# Patient Record
Sex: FEMALE | Race: WHITE | NOT HISPANIC OR LATINO | URBAN - METROPOLITAN AREA
[De-identification: names, ages, dates, MRNs, and addresses within clinical notes are randomized per-mention and may not be internally consistent; named-entity substitution may affect disease eponyms.]

---

## 2020-01-17 ENCOUNTER — APPOINTMENT (RX ONLY)
Dept: URBAN - METROPOLITAN AREA CLINIC 310 | Facility: CLINIC | Age: 55
Setting detail: DERMATOLOGY
End: 2020-01-17

## 2020-01-17 DIAGNOSIS — D22 MELANOCYTIC NEVI: ICD-10-CM

## 2020-01-17 DIAGNOSIS — L81.4 OTHER MELANIN HYPERPIGMENTATION: ICD-10-CM

## 2020-01-17 DIAGNOSIS — L82.0 INFLAMED SEBORRHEIC KERATOSIS: ICD-10-CM

## 2020-01-17 DIAGNOSIS — D18.0 HEMANGIOMA: ICD-10-CM

## 2020-01-17 DIAGNOSIS — L82.1 OTHER SEBORRHEIC KERATOSIS: ICD-10-CM

## 2020-01-17 PROBLEM — D22.5 MELANOCYTIC NEVI OF TRUNK: Status: ACTIVE | Noted: 2020-01-17

## 2020-01-17 PROBLEM — D18.01 HEMANGIOMA OF SKIN AND SUBCUTANEOUS TISSUE: Status: ACTIVE | Noted: 2020-01-17

## 2020-01-17 PROBLEM — D04.5 CARCINOMA IN SITU OF SKIN OF TRUNK: Status: ACTIVE | Noted: 2020-01-17

## 2020-01-17 PROCEDURE — ? FULL BODY SKIN EXAM

## 2020-01-17 PROCEDURE — ? COUNSELING

## 2020-01-17 PROCEDURE — ? LIQUID NITROGEN

## 2020-01-17 PROCEDURE — ? CRYOSURGICAL DESTRUCTION

## 2020-01-17 PROCEDURE — 99203 OFFICE O/P NEW LOW 30 MIN: CPT | Mod: 25

## 2020-01-17 PROCEDURE — 17110 DESTRUCTION B9 LES UP TO 14: CPT | Mod: 59

## 2020-01-17 PROCEDURE — 17262 DSTRJ MAL LES T/A/L 1.1-2.0: CPT

## 2020-01-17 PROCEDURE — ? SUNSCREEN RECOMMENDATIONS

## 2020-01-17 ASSESSMENT — LOCATION DETAILED DESCRIPTION DERM
LOCATION DETAILED: RIGHT DISTAL POSTERIOR THIGH
LOCATION DETAILED: RIGHT DISTAL DORSAL FOREARM
LOCATION DETAILED: RIGHT MEDIAL UPPER BACK
LOCATION DETAILED: RIGHT INFERIOR UPPER BACK
LOCATION DETAILED: LEFT MEDIAL UPPER BACK
LOCATION DETAILED: SUBMENTAL CHIN
LOCATION DETAILED: LEFT INFERIOR UPPER BACK

## 2020-01-17 ASSESSMENT — LOCATION ZONE DERM
LOCATION ZONE: FACE
LOCATION ZONE: TRUNK
LOCATION ZONE: LEG
LOCATION ZONE: ARM

## 2020-01-17 ASSESSMENT — LOCATION SIMPLE DESCRIPTION DERM
LOCATION SIMPLE: SUBMENTAL CHIN
LOCATION SIMPLE: RIGHT FOREARM
LOCATION SIMPLE: RIGHT UPPER BACK
LOCATION SIMPLE: RIGHT POSTERIOR THIGH
LOCATION SIMPLE: LEFT UPPER BACK

## 2020-01-17 NOTE — HPI: FULL BODY SKIN EXAMINATION
What Is The Reason For Today's Visit?: Full Body Skin Examination
What Is The Reason For Today's Visit? (Being Monitored For X): the development of new lesions
How Severe Are Your Spot(S)?: mild
Additional History: Fbse with spots of concern on her right forearm and right posterior thigh. Her pcp did a biopsy on her back of a sccis and sent her to get a fbse.

## 2020-01-17 NOTE — PROCEDURE: LIQUID NITROGEN
Medical Necessity Clause: This procedure was medically necessary because the lesions that were treated were:
Render Note In Bullet Format When Appropriate: No
Number Of Freeze-Thaw Cycles: 1 freeze-thaw cycle
Render Post-Care Instructions In Note?: yes
Post-Care Instructions: I reviewed with the patient in detail post-care instructions. Patient is to wear sunprotection, and avoid picking at any of the treated lesions. Pt may apply Vaseline to crusted or scabbing areas. Call if not resolved in 6 weeks
Consent: The patient's consent was obtained including but not limited to risks of crusting, scabbing, blistering, scarring, darker or lighter pigmentary change, recurrence, incomplete removal and infection.
Detail Level: Detailed
Medical Necessity Information: It is in your best interest to select a reason for this procedure from the list below. All of these items fulfill various CMS LCD requirements except the new and changing color options.

## 2020-01-17 NOTE — PROCEDURE: CRYOSURGICAL DESTRUCTION
Anesthesia Volume In Cc: 0
Additional Information: (Optional): The patient received detailed post-op instructions. The biopsy was completed by the patient's primary care provider. Path report which I reviewed today will be scanned into her chart
Consent was obtained from the patient. The risks and benefits to therapy were discussed in detail. Specifically, the risks of scarring, prolonged wound healing, incomplete removal, and recurrence were addressed. Alternatives to liquid nitrogen, such as ED&C, topical tx, and surgical removal were also discussed.  Prior to the procedure, the treatment site was clearly identified and confirmed by the patient.
Total Volume (Ccs): 1
Add Intralesional Injection: No
Medication Injected: 5-Fluorouracil
Bill As A Line Item Or As Units: Line Item
Number Of Freeze-Thaw Cycles: 1 freeze-thaw cycle
Detail Level: Detailed
Size Of Lesion In Cm: 1.2
Render Post-Care In The Note: Yes
Concentration (Mg/Ml Or Millions Of Plaque Forming Units/Cc): 0.01
Post-Care Instructions: I reviewed with the patient in detail post-care instructions. Call if does not resolve or if recurs.

## 2020-06-01 ENCOUNTER — APPOINTMENT (RX ONLY)
Dept: URBAN - METROPOLITAN AREA CLINIC 310 | Facility: CLINIC | Age: 55
Setting detail: DERMATOLOGY
End: 2020-06-01

## 2020-06-01 VITALS — TEMPERATURE: 98.1 F

## 2020-06-01 DIAGNOSIS — L259 CONTACT DERMATITIS AND OTHER ECZEMA, UNSPECIFIED CAUSE: ICD-10-CM

## 2020-06-01 DIAGNOSIS — L82.0 INFLAMED SEBORRHEIC KERATOSIS: ICD-10-CM

## 2020-06-01 DIAGNOSIS — D485 NEOPLASM OF UNCERTAIN BEHAVIOR OF SKIN: ICD-10-CM

## 2020-06-01 PROBLEM — D48.5 NEOPLASM OF UNCERTAIN BEHAVIOR OF SKIN: Status: ACTIVE | Noted: 2020-06-01

## 2020-06-01 PROBLEM — L23.9 ALLERGIC CONTACT DERMATITIS, UNSPECIFIED CAUSE: Status: ACTIVE | Noted: 2020-06-01

## 2020-06-01 PROCEDURE — 99213 OFFICE O/P EST LOW 20 MIN: CPT | Mod: 25

## 2020-06-01 PROCEDURE — 11102 TANGNTL BX SKIN SINGLE LES: CPT | Mod: 59

## 2020-06-01 PROCEDURE — ? LIQUID NITROGEN

## 2020-06-01 PROCEDURE — ? PRESCRIPTION

## 2020-06-01 PROCEDURE — 17110 DESTRUCTION B9 LES UP TO 14: CPT

## 2020-06-01 PROCEDURE — ? COUNSELING

## 2020-06-01 PROCEDURE — ? BIOPSY BY SHAVE METHOD

## 2020-06-01 RX ORDER — CLOBETASOL PROPIONATE 0.5 MG/ML
SOLUTION TOPICAL BID
Qty: 1 | Refills: 2 | Status: ERX | COMMUNITY
Start: 2020-06-01

## 2020-06-01 RX ADMIN — CLOBETASOL PROPIONATE: 0.5 SOLUTION TOPICAL at 00:00

## 2020-06-01 ASSESSMENT — LOCATION SIMPLE DESCRIPTION DERM
LOCATION SIMPLE: RIGHT FOREHEAD
LOCATION SIMPLE: RIGHT HAND
LOCATION SIMPLE: SCALP
LOCATION SIMPLE: RIGHT UPPER BACK

## 2020-06-01 ASSESSMENT — LOCATION ZONE DERM
LOCATION ZONE: HAND
LOCATION ZONE: FACE
LOCATION ZONE: SCALP
LOCATION ZONE: TRUNK

## 2020-06-01 ASSESSMENT — LOCATION DETAILED DESCRIPTION DERM
LOCATION DETAILED: LEFT CENTRAL POSTAURICULAR SKIN
LOCATION DETAILED: RIGHT INFERIOR LATERAL FOREHEAD
LOCATION DETAILED: RIGHT SUPERIOR MEDIAL UPPER BACK
LOCATION DETAILED: RIGHT DORSAL MIDDLE FINGER METACARPOPHALANGEAL JOINT

## 2020-06-01 NOTE — PROCEDURE: COUNSELING
Patient Specific Counseling (Will Not Stick From Patient To Patient): Suspect a nickel allergy patient to change her earrings
Detail Level: Detailed

## 2020-06-01 NOTE — HPI: EVALUATION OF SKIN LESION(S)
Hpi Title: Evaluation of Skin Lesions
How Severe Are Your Spot(S)?: mild
Have Your Spot(S) Been Treated In The Past?: has not been treated
Year Removed: 1900
Additional History: very happy w/ LN to isk last visit. has some irritating spots.

## 2020-06-01 NOTE — HPI: SECONDARY COMPLAINT
How Severe Is This Condition?: mild
Additional History: Patient had scabies last year and was allergic to the treatment since then she states her posterior neck/scalp and posterior left ear have been extremely itchy and rash.

## 2020-06-01 NOTE — PROCEDURE: LIQUID NITROGEN
Render In Bullet Format When Appropriate: No
Detail Level: Zone
Duration Of Freeze Thaw-Cycle (Seconds): 0
Render Post Care In The Note?: yes
Post-Care Instructions: I reviewed with the patient in detail post-care instructions. Patient is to wear sunprotection, and avoid picking at any of the treated lesions. Pt may apply Vaseline to crusted or scabbing areas. Call if not resolved in 6 weeks.
Total Number Of Lesions Treated: 2
Medical Necessity Clause: This procedure was medically necessary because the lesions that were treated were:
Consent: The patient's consent was obtained including but not limited to risks of crusting, scabbing, blistering, scarring, darker or lighter pigmentary change, recurrence, incomplete removal and infection.
Medical Necessity Information: It is in your best interest to select a reason for this procedure from the list below. All of these items fulfill various CMS LCD requirements except the new and changing color options.

## 2020-06-01 NOTE — PROCEDURE: BIOPSY BY SHAVE METHOD
Anticipated Plan (Based On Presumed Biopsy Results): possibly residual SCC in situ. Will bx and tx w/ ED&C today.

## 2020-09-02 ENCOUNTER — APPOINTMENT (RX ONLY)
Dept: URBAN - METROPOLITAN AREA CLINIC 310 | Facility: CLINIC | Age: 55
Setting detail: DERMATOLOGY
End: 2020-09-02

## 2020-09-02 VITALS — TEMPERATURE: 98.2 F

## 2020-09-02 DIAGNOSIS — L82.0 INFLAMED SEBORRHEIC KERATOSIS: ICD-10-CM

## 2020-09-02 DIAGNOSIS — L259 CONTACT DERMATITIS AND OTHER ECZEMA, UNSPECIFIED CAUSE: ICD-10-CM

## 2020-09-02 PROBLEM — L23.9 ALLERGIC CONTACT DERMATITIS, UNSPECIFIED CAUSE: Status: ACTIVE | Noted: 2020-09-02

## 2020-09-02 PROCEDURE — ? COUNSELING

## 2020-09-02 PROCEDURE — 17110 DESTRUCTION B9 LES UP TO 14: CPT

## 2020-09-02 PROCEDURE — 99213 OFFICE O/P EST LOW 20 MIN: CPT | Mod: 25

## 2020-09-02 PROCEDURE — ? ORDER FOR PATCH TESTING

## 2020-09-02 PROCEDURE — ? LIQUID NITROGEN

## 2020-09-02 ASSESSMENT — LOCATION SIMPLE DESCRIPTION DERM
LOCATION SIMPLE: RIGHT SHOULDER
LOCATION SIMPLE: SCALP

## 2020-09-02 ASSESSMENT — LOCATION DETAILED DESCRIPTION DERM
LOCATION DETAILED: RIGHT INFERIOR POSTAURICULAR SKIN
LOCATION DETAILED: LEFT INFERIOR POSTAURICULAR SKIN
LOCATION DETAILED: RIGHT POSTERIOR SHOULDER

## 2020-09-02 ASSESSMENT — LOCATION ZONE DERM
LOCATION ZONE: ARM
LOCATION ZONE: SCALP

## 2020-09-02 NOTE — PROCEDURE: ORDER FOR PATCH TESTING
Patch Test Reading Schedule: First Reading at 48 hours and Second Reading at 72 hours
Counseling: I discussed the timing of the procedure and ensured the patient understands that this test requires multiple visits. No topical steroids applied should be applied to the patch testing location and no oral prednisone for two week prior to the test. While the patches are in place they should be kept dry which will limit bathing, swimming an exercise. I also explained that it is common for testing to be negative and this doesn't mean there isn't a allergic reaction occurring. During the testing itching is common.
Detail Level: Simple
Location Patches Should Be Applied: Back
Patch Test To Be Applied: [employer] Extended Series

## 2020-09-02 NOTE — PROCEDURE: LIQUID NITROGEN
Include Z78.9 (Other Specified Conditions Influencing Health Status) As An Associated Diagnosis?: No
Detail Level: Detailed
Consent: The patient's consent was obtained including but not limited to risks of crusting, scabbing, blistering, scarring, darker or lighter pigmentary change, recurrence, incomplete removal and infection.
Medical Necessity Information: It is in your best interest to select a reason for this procedure from the list below. All of these items fulfill various CMS LCD requirements except the new and changing color options.
Post-Care Instructions: I reviewed with the patient in detail post-care instructions. Patient is to wear sunprotection, and avoid picking at any of the treated lesions. Pt may apply Vaseline to crusted or scabbing areas. Call if not resolved in 6 weeks
Number Of Freeze-Thaw Cycles: 1 freeze-thaw cycle
Medical Necessity Clause: This procedure was medically necessary because the lesions that were treated were:
Render Post-Care Instructions In Note?: yes

## 2020-10-05 ENCOUNTER — APPOINTMENT (RX ONLY)
Dept: URBAN - METROPOLITAN AREA CLINIC 310 | Facility: CLINIC | Age: 55
Setting detail: DERMATOLOGY
End: 2020-10-05

## 2020-10-05 DIAGNOSIS — L259 CONTACT DERMATITIS AND OTHER ECZEMA, UNSPECIFIED CAUSE: ICD-10-CM

## 2020-10-05 PROBLEM — L23.9 ALLERGIC CONTACT DERMATITIS, UNSPECIFIED CAUSE: Status: ACTIVE | Noted: 2020-10-05

## 2020-10-05 PROCEDURE — ? PATCH TESTING

## 2020-10-05 PROCEDURE — 95044 PATCH/APPLICATION TESTS: CPT

## 2020-10-05 NOTE — PROCEDURE: PATCH TESTING
Post-Care Instructions: I reviewed with the patient in detail post-care instructions. Patient should not sweat, pick at, or get the patches wet for 48 hours.
Detail Level: None
Consent: Verbal consent obtained, risks reviewed including but not limited to rash, itching, allergic reaction, systemic rash, remote possiblity of anaphylaxis to allergen.
Number Of Patches (Maximum Allowable Per Dos By Cms Is 90): 78

## 2020-10-05 NOTE — HPI: TESTING (PATCH TESTING)
How Severe Is Your Rash At Its Worst?: moderate
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 no unusual exposures at work or via hobbies.

## 2020-10-07 ENCOUNTER — APPOINTMENT (RX ONLY)
Dept: URBAN - METROPOLITAN AREA CLINIC 310 | Facility: CLINIC | Age: 55
Setting detail: DERMATOLOGY
End: 2020-10-07

## 2020-10-07 DIAGNOSIS — L259 CONTACT DERMATITIS AND OTHER ECZEMA, UNSPECIFIED CAUSE: ICD-10-CM

## 2020-10-07 PROBLEM — L23.9 ALLERGIC CONTACT DERMATITIS, UNSPECIFIED CAUSE: Status: ACTIVE | Noted: 2020-10-07

## 2020-10-07 PROCEDURE — ? NORTH AMERICAN 80 PATCH TEST READING

## 2020-10-07 PROCEDURE — 99212 OFFICE O/P EST SF 10 MIN: CPT

## 2020-10-07 PROCEDURE — ? PATCH TEST REMOVAL

## 2020-10-07 PROCEDURE — ? OTHER

## 2020-10-07 NOTE — PROCEDURE: NORTH AMERICAN 80 PATCH TEST READING
Allergen 89 Reaction: no reaction
Show Allergen Counseling In The Note?: No
Detail Level: Zone
Show Negative Results In The Note?: Yes
What Reading Time Point?: 48 hour
Number Of Patches Read: 78

## 2020-10-07 NOTE — PROCEDURE: OTHER
Other (Free Text): # Allergen Result\\n1 nickel sulfate hexahydrate -\\n2 balsam of peru (myroxylon pereirae resin) -\\n3 fragrance mix -\\n4 quaternium 15 -\\n5 neomycin sulfate -\\n6 budesonide -\\n7 cobalt (II) chloride hexahydrate -\\n8 u-kpvg-nsundzdlodo formaldehyde resin -\\n9 4-phenylenediamine base -\\n10 potassium dichromate -\\n11 carba mix -\\n12 thiuram mix  -\\n13 diazolidinyl urea (Germall® II) -\\n14 paraben mix  -\\n15 black rubber mix - PPD -\\n16 imidazolidinyl urea (Germall® 115) -\\n17 mercapto mix -\\n18 tixocortol-21-pivalate -\\n19 2-mercaptobenzothiazole -\\n20 colophony -\\n21 bisphenol A epoxy resin -\\n22 ethylenediamine dihydrochloride -\\n23 amerchol L101 -\\n24 benzocaine -\\n25 bacitracin -\\n26 DMDM hydantoin -\\n27 Benzoic acid -\\n28 Ethylhexyl glycerol -\\n29 2-bromo-2-nitropropane-1,3-diol (bronopol™) -\\n30 lidocaine-HCl -\\n31 gold sodium thiosulfate -\\n32 methyldibromo glutaronitrile (MDBGN) -\\n33 disperse blue mix  -\\n34 hydrocortisone-17-butyrate -\\n35 fragrance mix II -\\n36 iodopropynyl butylcarbamate -\\n37 mixed dialkyl thioureas -\\n38 2-hydroxyethyl methacrylate (HEMA) -\\n39 decyl glucoside -\\n40 methyl methacrylate -\\n41 cinnamic aldehyde -\\n42 ethyl acrylate -\\n43 tea tree oil, oxidized -\\n44 4-chloro-3,5-xylenol (PCMX) -\\n45 propolis -\\n46 2-hydroxy-4-methoxybenzophenone (oxybenzone) -\\n47 tosylamide formaldehyde resin -\\n48 sesquiterpenelactone mix -\\n49 coconut diethanolamide (cocamide DONALDO) -\\n50 4-chloro-3-cresol (PCMC) -\\n51 benzalkonium chloride -\\n52 benzophenone-4 -\\n53 Polymyxin B sulfate -\\n54 sorbic acid -\\n55 cananga odorata (kristen peterson) -\\n56 compositae mix -\\n57 ethyleneurea, melamine formaldehyde mix (NOT TESTED)\\n58 sorbitan sesquioleate -\\n59 1,3-diphenylguanidine (DPG) -\\n60 cetylstearylalcohol -\\n61 Sodium benzoate -\\n62 triamcinolone acetonide -\\n63 clobetasol-17-propionate -\\n64 dl alpha tocopherol -\\n65 ethyl cyanoacrylate -\\n66 phenoxyethanol -\\n67 disperse orange-3 -\\n68 Lavender oil (lavandula angustifolia oil) -\\n69 butylhydroxytoluene (BHT) -\\n70 2-ethylhexyl-4-methoxycinnamate (octinoxate) -\\n71 benzyl alcohol -\\n72 formaldehyde -\\n73 methylchloroisothiazolinone/methylisothiazolinone -\\n74 methylisothiazolinone -\\n75 cocamidopropyl betaine -\\n76 dimethylaminopropylamine (DMAPA) -\\n77 oleamidopropyl dimethylamine -\\n78 propylene glycol -\\n79 amidoamine (stearamidopropyl dimethylamine) -\\n80 chlorhexidine digluconate (NOT TESTED) Other (Free Text): # Allergen Result\\n1 nickel sulfate hexahydrate -\\n2 balsam of peru (myroxylon pereirae resin) -\\n3 fragrance mix -\\n4 quaternium 15 -\\n5 neomycin sulfate -\\n6 budesonide -\\n7 cobalt (II) chloride hexahydrate -\\n8 e-jtjn-tjviwuscoip formaldehyde resin -\\n9 4-phenylenediamine base -\\n10 potassium dichromate -\\n11 carba mix -\\n12 thiuram mix  -\\n13 diazolidinyl urea (Germall® II) -\\n14 paraben mix  -\\n15 black rubber mix - PPD -\\n16 imidazolidinyl urea (Germall® 115) -\\n17 mercapto mix -\\n18 tixocortol-21-pivalate -\\n19 2-mercaptobenzothiazole -\\n20 colophony -\\n21 bisphenol A epoxy resin -\\n22 ethylenediamine dihydrochloride -\\n23 amerchol L101 -\\n24 benzocaine -\\n25 bacitracin -\\n26 DMDM hydantoin -\\n27 Benzoic acid -\\n28 Ethylhexyl glycerol -\\n29 2-bromo-2-nitropropane-1,3-diol (bronopol™) -\\n30 lidocaine-HCl -\\n31 gold sodium thiosulfate -\\n32 methyldibromo glutaronitrile (MDBGN) -\\n33 disperse blue mix  -\\n34 hydrocortisone-17-butyrate -\\n35 fragrance mix II -\\n36 iodopropynyl butylcarbamate -\\n37 mixed dialkyl thioureas -\\n38 2-hydroxyethyl methacrylate (HEMA) -\\n39 decyl glucoside -\\n40 methyl methacrylate -\\n41 cinnamic aldehyde -\\n42 ethyl acrylate -\\n43 tea tree oil, oxidized -\\n44 4-chloro-3,5-xylenol (PCMX) -\\n45 propolis -\\n46 2-hydroxy-4-methoxybenzophenone (oxybenzone) -\\n47 tosylamide formaldehyde resin -\\n48 sesquiterpenelactone mix -\\n49 coconut diethanolamide (cocamide DONALDO) -\\n50 4-chloro-3-cresol (PCMC) -\\n51 benzalkonium chloride -\\n52 benzophenone-4 -\\n53 Polymyxin B sulfate -\\n54 sorbic acid -\\n55 cananga odorata (kristen peterson) -\\n56 compositae mix -\\n57 ethyleneurea, melamine formaldehyde mix (NOT TESTED)\\n58 sorbitan sesquioleate -\\n59 1,3-diphenylguanidine (DPG) -\\n60 cetylstearylalcohol -\\n61 Sodium benzoate -\\n62 triamcinolone acetonide -\\n63 clobetasol-17-propionate -\\n64 dl alpha tocopherol -\\n65 ethyl cyanoacrylate -\\n66 phenoxyethanol -\\n67 disperse orange-3 -\\n68 Lavender oil (lavandula angustifolia oil) -\\n69 butylhydroxytoluene (BHT) -\\n70 2-ethylhexyl-4-methoxycinnamate (octinoxate) -\\n71 benzyl alcohol -\\n72 formaldehyde -\\n73 methylchloroisothiazolinone/methylisothiazolinone -\\n74 methylisothiazolinone -\\n75 cocamidopropyl betaine -\\n76 dimethylaminopropylamine (DMAPA) -\\n77 oleamidopropyl dimethylamine -\\n78 propylene glycol -\\n79 amidoamine (stearamidopropyl dimethylamine) -\\n80 chlorhexidine digluconate (NOT TESTED)

## 2020-10-09 ENCOUNTER — APPOINTMENT (RX ONLY)
Dept: URBAN - METROPOLITAN AREA CLINIC 310 | Facility: CLINIC | Age: 55
Setting detail: DERMATOLOGY
End: 2020-10-09

## 2020-10-09 VITALS — TEMPERATURE: 98.5 F

## 2020-10-09 DIAGNOSIS — L259 CONTACT DERMATITIS AND OTHER ECZEMA, UNSPECIFIED CAUSE: ICD-10-CM

## 2020-10-09 PROBLEM — L23.9 ALLERGIC CONTACT DERMATITIS, UNSPECIFIED CAUSE: Status: ACTIVE | Noted: 2020-10-09

## 2020-10-09 PROCEDURE — 99213 OFFICE O/P EST LOW 20 MIN: CPT

## 2020-10-09 PROCEDURE — ? OTHER

## 2020-10-09 PROCEDURE — ? NORTH AMERICAN 80 PATCH TEST READING

## 2020-10-09 NOTE — PROCEDURE: NORTH AMERICAN 80 PATCH TEST READING
Allergen 76 Reaction: no reaction
What Reading Time Point?: 96 hour
Detail Level: Zone
Show Allergen Counseling In The Note?: No
Show Negative Results In The Note?: Yes
Number Of Patches Read: 78

## 2020-10-09 NOTE — PROCEDURE: OTHER
Other (Free Text): # Allergen Result\\n1 nickel sulfate hexahydrate -\\n2 balsam of peru (myroxylon pereirae resin) -\\n3 fragrance mix -\\n4 quaternium 15 -\\n5 neomycin sulfate -\\n6 budesonide -\\n7 cobalt (II) chloride hexahydrate -\\n8 u-dehb-vvjvhmflxxa formaldehyde resin -\\n9 4-phenylenediamine base -\\n10 potassium dichromate -\\n11 carba mix -\\n12 thiuram mix  -\\n13 diazolidinyl urea (Germall® II) -\\n14 paraben mix  -\\n15 black rubber mix - PPD -\\n16 imidazolidinyl urea (Germall® 115) -\\n17 mercapto mix -\\n18 tixocortol-21-pivalate -\\n19 2-mercaptobenzothiazole -\\n20 colophony -\\n21 bisphenol A epoxy resin -\\n22 ethylenediamine dihydrochloride -\\n23 amerchol L101 -\\n24 benzocaine -\\n25 bacitracin -\\n26 DMDM hydantoin -\\n27 Benzoic acid -\\n28 Ethylhexyl glycerol -\\n29 2-bromo-2-nitropropane-1,3-diol (bronopol™) -\\n30 lidocaine-HCl -\\n31 gold sodium thiosulfate -\\n32 methyldibromo glutaronitrile (MDBGN) -\\n33 disperse blue mix  -\\n34 hydrocortisone-17-butyrate -\\n35 fragrance mix II -\\n36 iodopropynyl butylcarbamate -\\n37 mixed dialkyl thioureas -\\n38 2-hydroxyethyl methacrylate (HEMA) -\\n39 decyl glucoside -\\n40 methyl methacrylate -\\n41 cinnamic aldehyde -\\n42 ethyl acrylate -\\n43 tea tree oil, oxidized -\\n44 4-chloro-3,5-xylenol (PCMX) -\\n45 propolis -\\n46 2-hydroxy-4-methoxybenzophenone (oxybenzone) -\\n47 tosylamide formaldehyde resin -\\n48 sesquiterpenelactone mix -\\n49 coconut diethanolamide (cocamide DONALDO) -\\n50 4-chloro-3-cresol (PCMC) -\\n51 benzalkonium chloride -\\n52 benzophenone-4 -\\n53 Polymyxin B sulfate -\\n54 sorbic acid -\\n55 cananga odorata (kristen peterson) -\\n56 compositae mix -\\n57 ethyleneurea, melamine formaldehyde mix -\\n58 sorbitan sesquioleate -\\n59 1,3-diphenylguanidine (DPG) -\\n60 cetylstearylalcohol -\\n61 Sodium benzoate -\\n62 triamcinolone acetonide -\\n63 clobetasol-17-propionate -\\n64 dl alpha tocopherol -\\n65 ethyl cyanoacrylate -\\n66 phenoxyethanol -\\n67 disperse orange-3 -\\n68 Lavender oil (lavandula angustifolia oil) -\\n69 butylhydroxytoluene (BHT) -\\n70 2-ethylhexyl-4-methoxycinnamate (octinoxate) -\\n71 benzyl alcohol -\\n72 formaldehyde -\\n73 methylchloroisothiazolinone/methylisothiazolinone -\\n74 methylisothiazolinone -\\n75 cocamidopropyl betaine -\\n76 dimethylaminopropylamine (DMAPA) -\\n77 oleamidopropyl dimethylamine -\\n78 propylene glycol -\\n79 amidoamine (stearamidopropyl dimethylamine) -\\n80 chlorhexidine digluconate - Other (Free Text): # Allergen Result\\n1 nickel sulfate hexahydrate -\\n2 balsam of peru (myroxylon pereirae resin) -\\n3 fragrance mix -\\n4 quaternium 15 -\\n5 neomycin sulfate -\\n6 budesonide -\\n7 cobalt (II) chloride hexahydrate -\\n8 i-mqhe-hcqxmxwukgi formaldehyde resin -\\n9 4-phenylenediamine base -\\n10 potassium dichromate -\\n11 carba mix -\\n12 thiuram mix  -\\n13 diazolidinyl urea (Germall® II) -\\n14 paraben mix  -\\n15 black rubber mix - PPD -\\n16 imidazolidinyl urea (Germall® 115) -\\n17 mercapto mix -\\n18 tixocortol-21-pivalate -\\n19 2-mercaptobenzothiazole -\\n20 colophony -\\n21 bisphenol A epoxy resin -\\n22 ethylenediamine dihydrochloride -\\n23 amerchol L101 -\\n24 benzocaine -\\n25 bacitracin -\\n26 DMDM hydantoin -\\n27 Benzoic acid -\\n28 Ethylhexyl glycerol -\\n29 2-bromo-2-nitropropane-1,3-diol (bronopol™) -\\n30 lidocaine-HCl -\\n31 gold sodium thiosulfate -\\n32 methyldibromo glutaronitrile (MDBGN) -\\n33 disperse blue mix  -\\n34 hydrocortisone-17-butyrate -\\n35 fragrance mix II -\\n36 iodopropynyl butylcarbamate -\\n37 mixed dialkyl thioureas -\\n38 2-hydroxyethyl methacrylate (HEMA) -\\n39 decyl glucoside -\\n40 methyl methacrylate -\\n41 cinnamic aldehyde -\\n42 ethyl acrylate -\\n43 tea tree oil, oxidized -\\n44 4-chloro-3,5-xylenol (PCMX) -\\n45 propolis -\\n46 2-hydroxy-4-methoxybenzophenone (oxybenzone) -\\n47 tosylamide formaldehyde resin -\\n48 sesquiterpenelactone mix -\\n49 coconut diethanolamide (cocamide DONALDO) -\\n50 4-chloro-3-cresol (PCMC) -\\n51 benzalkonium chloride -\\n52 benzophenone-4 -\\n53 Polymyxin B sulfate -\\n54 sorbic acid -\\n55 cananga odorata (kristen peterson) -\\n56 compositae mix -\\n57 ethyleneurea, melamine formaldehyde mix -\\n58 sorbitan sesquioleate -\\n59 1,3-diphenylguanidine (DPG) -\\n60 cetylstearylalcohol -\\n61 Sodium benzoate -\\n62 triamcinolone acetonide -\\n63 clobetasol-17-propionate -\\n64 dl alpha tocopherol -\\n65 ethyl cyanoacrylate -\\n66 phenoxyethanol -\\n67 disperse orange-3 -\\n68 Lavender oil (lavandula angustifolia oil) -\\n69 butylhydroxytoluene (BHT) -\\n70 2-ethylhexyl-4-methoxycinnamate (octinoxate) -\\n71 benzyl alcohol -\\n72 formaldehyde -\\n73 methylchloroisothiazolinone/methylisothiazolinone -\\n74 methylisothiazolinone -\\n75 cocamidopropyl betaine -\\n76 dimethylaminopropylamine (DMAPA) -\\n77 oleamidopropyl dimethylamine -\\n78 propylene glycol -\\n79 amidoamine (stearamidopropyl dimethylamine) -\\n80 chlorhexidine digluconate -

## 2020-10-19 ENCOUNTER — RX ONLY (OUTPATIENT)
Age: 55
Setting detail: RX ONLY
End: 2020-10-19

## 2020-10-19 RX ORDER — PREDNISONE 10 MG/1
TABLET ORAL
Qty: 40 | Refills: 0 | Status: ERX | COMMUNITY
Start: 2020-10-19

## 2020-11-13 ENCOUNTER — APPOINTMENT (RX ONLY)
Dept: URBAN - METROPOLITAN AREA CLINIC 310 | Facility: CLINIC | Age: 55
Setting detail: DERMATOLOGY
End: 2020-11-13

## 2020-11-13 VITALS — TEMPERATURE: 97.8 F

## 2020-11-13 DIAGNOSIS — D485 NEOPLASM OF UNCERTAIN BEHAVIOR OF SKIN: ICD-10-CM

## 2020-11-13 DIAGNOSIS — L30.9 DERMATITIS, UNSPECIFIED: ICD-10-CM

## 2020-11-13 PROBLEM — D48.5 NEOPLASM OF UNCERTAIN BEHAVIOR OF SKIN: Status: ACTIVE | Noted: 2020-11-13

## 2020-11-13 PROCEDURE — ? BIOPSY BY PUNCH METHOD

## 2020-11-13 PROCEDURE — 11104 PUNCH BX SKIN SINGLE LESION: CPT

## 2020-11-13 PROCEDURE — ? COUNSELING

## 2020-11-13 PROCEDURE — 99213 OFFICE O/P EST LOW 20 MIN: CPT | Mod: 25

## 2020-11-13 PROCEDURE — ? PRESCRIPTION

## 2020-11-13 RX ORDER — CLOBETASOL PROPIONATE 0.5 MG/G
OINTMENT TOPICAL BID
Qty: 1 | Refills: 0 | Status: ERX | COMMUNITY
Start: 2020-11-13

## 2020-11-13 RX ADMIN — CLOBETASOL PROPIONATE: 0.5 OINTMENT TOPICAL at 00:00

## 2020-11-13 ASSESSMENT — LOCATION ZONE DERM: LOCATION ZONE: ARM

## 2020-11-13 ASSESSMENT — LOCATION SIMPLE DESCRIPTION DERM: LOCATION SIMPLE: RIGHT SHOULDER

## 2020-11-13 ASSESSMENT — LOCATION DETAILED DESCRIPTION DERM: LOCATION DETAILED: RIGHT POSTERIOR SHOULDER

## 2020-11-13 NOTE — PROCEDURE: COUNSELING
Detail Level: Zone
Patient Specific Counseling (Will Not Stick From Patient To Patient): To use the clobetasol liquid in her scalp and the ointment on neck and back

## 2020-11-23 ENCOUNTER — APPOINTMENT (RX ONLY)
Dept: URBAN - METROPOLITAN AREA CLINIC 310 | Facility: CLINIC | Age: 55
Setting detail: DERMATOLOGY
End: 2020-11-23

## 2020-11-23 VITALS — TEMPERATURE: 97.1 F

## 2020-11-23 DIAGNOSIS — L259 CONTACT DERMATITIS AND OTHER ECZEMA, UNSPECIFIED CAUSE: ICD-10-CM | Status: IMPROVED

## 2020-11-23 PROBLEM — L23.9 ALLERGIC CONTACT DERMATITIS, UNSPECIFIED CAUSE: Status: ACTIVE | Noted: 2020-11-23

## 2020-11-23 PROCEDURE — 99213 OFFICE O/P EST LOW 20 MIN: CPT

## 2020-11-23 PROCEDURE — ? COUNSELING

## 2020-11-23 NOTE — PROCEDURE: COUNSELING
Detail Level: Zone
Patient Specific Counseling (Will Not Stick From Patient To Patient): Discussed pathology results ddx. discussed allergic contact vs a medication reaction. Only one medication is new the abilify but this was started only 2 months ago and the rash has been present since June. On patch testing she was allergic to balsam of peru and benzoic acid. She is already using [facility] shampoo. She can continue to treat with the clobetasol as needed for itch. Already improved this week from clobetasol. We would like her to switch her laundry detergent as well to free and clear. Gave her a copy of the low balsam diet to read and see if she needs to eliminate anything from her diet.

## 2020-12-18 ENCOUNTER — APPOINTMENT (RX ONLY)
Dept: URBAN - METROPOLITAN AREA CLINIC 310 | Facility: CLINIC | Age: 55
Setting detail: DERMATOLOGY
End: 2020-12-18

## 2020-12-18 DIAGNOSIS — L20.89 OTHER ATOPIC DERMATITIS: ICD-10-CM

## 2020-12-18 DIAGNOSIS — L259 CONTACT DERMATITIS AND OTHER ECZEMA, UNSPECIFIED CAUSE: ICD-10-CM

## 2020-12-18 PROBLEM — L23.9 ALLERGIC CONTACT DERMATITIS, UNSPECIFIED CAUSE: Status: ACTIVE | Noted: 2020-12-18

## 2020-12-18 PROCEDURE — 99213 OFFICE O/P EST LOW 20 MIN: CPT | Mod: 25

## 2020-12-18 PROCEDURE — ? INTRAMUSCULAR KENALOG

## 2020-12-18 PROCEDURE — 96372 THER/PROPH/DIAG INJ SC/IM: CPT

## 2020-12-18 PROCEDURE — ? COUNSELING

## 2020-12-18 ASSESSMENT — LOCATION ZONE DERM: LOCATION ZONE: TRUNK

## 2020-12-18 ASSESSMENT — LOCATION DETAILED DESCRIPTION DERM: LOCATION DETAILED: LEFT BUTTOCK

## 2020-12-18 ASSESSMENT — LOCATION SIMPLE DESCRIPTION DERM: LOCATION SIMPLE: LEFT BUTTOCK

## 2020-12-18 NOTE — PROCEDURE: INTRAMUSCULAR KENALOG
Total Volume (Ccs): 1.5
Kenalog Preparation: kenalog
Concentration (Mg/Ml) Of Additional Medication: 2.5
Consent: The risks of atrophy were reviewed with the patient.
Detail Level: None
Add Option For Additional Mediation: No
Concentration (Mg/Ml): 40.0

## 2020-12-18 NOTE — PROCEDURE: COUNSELING
Detail Level: Zone
Patient Specific Counseling (Will Not Stick From Patient To Patient): Counseled the patient that it takes up to 20 washes to remove all detergent residue from clothing. She can keep applying the topical clobetasol ointment as needed. Offered to give her an IM Kenalog inj or short course of prednisone if she would like for the short term. She would like. Also discussed tx options for likely background atopic derm such as mtx or dupixent. She would like more time w/ avoidance of her allergens before using those. Call me if needed.
Detail Level: Detailed

## 2021-01-22 ENCOUNTER — APPOINTMENT (RX ONLY)
Dept: URBAN - METROPOLITAN AREA CLINIC 310 | Facility: CLINIC | Age: 56
Setting detail: DERMATOLOGY
End: 2021-01-22

## 2021-01-22 VITALS — TEMPERATURE: 97.1 F

## 2021-01-22 DIAGNOSIS — L259 CONTACT DERMATITIS AND OTHER ECZEMA, UNSPECIFIED CAUSE: ICD-10-CM | Status: INADEQUATELY CONTROLLED

## 2021-01-22 DIAGNOSIS — L82.0 INFLAMED SEBORRHEIC KERATOSIS: ICD-10-CM

## 2021-01-22 DIAGNOSIS — L20.89 OTHER ATOPIC DERMATITIS: ICD-10-CM | Status: INADEQUATELY CONTROLLED

## 2021-01-22 PROBLEM — L23.9 ALLERGIC CONTACT DERMATITIS, UNSPECIFIED CAUSE: Status: ACTIVE | Noted: 2021-01-22

## 2021-01-22 PROCEDURE — ? COUNSELING

## 2021-01-22 PROCEDURE — ? PRESCRIPTION

## 2021-01-22 PROCEDURE — ? TREATMENT REGIMEN

## 2021-01-22 PROCEDURE — 99214 OFFICE O/P EST MOD 30 MIN: CPT | Mod: 25

## 2021-01-22 PROCEDURE — 17110 DESTRUCTION B9 LES UP TO 14: CPT

## 2021-01-22 PROCEDURE — ? LIQUID NITROGEN

## 2021-01-22 PROCEDURE — ? MEDICATION COUNSELING

## 2021-01-22 RX ORDER — DUPILUMAB 300 MG/2ML
INJECTION, SOLUTION SUBCUTANEOUS
Qty: 12 | Refills: 0 | COMMUNITY
Start: 2021-01-22

## 2021-01-22 RX ADMIN — DUPILUMAB: 300 INJECTION, SOLUTION SUBCUTANEOUS at 00:00

## 2021-01-22 ASSESSMENT — LOCATION ZONE DERM: LOCATION ZONE: TRUNK

## 2021-01-22 ASSESSMENT — LOCATION SIMPLE DESCRIPTION DERM: LOCATION SIMPLE: LEFT LOWER BACK

## 2021-01-22 ASSESSMENT — LOCATION DETAILED DESCRIPTION DERM: LOCATION DETAILED: LEFT INFERIOR LATERAL LOWER BACK

## 2021-01-22 NOTE — HPI: EVALUATION OF SKIN LESION(S)
Hpi Title: Evaluation of Skin Lesions
Year Removed: 1900
Additional History: She has a lesion on her left hip that her pants rub on that she would like possibly removed today.

## 2021-01-22 NOTE — PROCEDURE: LIQUID NITROGEN
Consent: The patient's consent was obtained including but not limited to risks of crusting, scabbing, blistering, scarring, darker or lighter pigmentary change, recurrence, incomplete removal and infection.
Detail Level: Detailed
Medical Necessity Information: It is in your best interest to select a reason for this procedure from the list below. All of these items fulfill various CMS LCD requirements except the new and changing color options.
Render Post-Care Instructions In Note?: yes
Post-Care Instructions: I reviewed with the patient in detail post-care instructions. Patient is to wear sunprotection, and avoid picking at any of the treated lesions. Pt may apply Vaseline to crusted or scabbing areas. Call if not resolved in 6 weeks
Medical Necessity Clause: This procedure was medically necessary because the lesions that were treated were:
Number Of Freeze-Thaw Cycles: 1 freeze-thaw cycle
Render Note In Bullet Format When Appropriate: No

## 2021-01-22 NOTE — PROCEDURE: COUNSELING
Detail Level: Zone
Patient Specific Counseling (Will Not Stick From Patient To Patient): Discussed Dupixent vs mtx. Opted to start Dupixent. Paperwork filled out today. She has asthma and a history of eczema.

## 2021-01-22 NOTE — PROCEDURE: MEDICATION COUNSELING
Xelkartikz Pregnancy And Lactation Text: This medication is Pregnancy Category D and is not considered safe during pregnancy.  The risk during breast feeding is also uncertain.

## 2021-03-22 ENCOUNTER — RX ONLY (OUTPATIENT)
Age: 56
Setting detail: RX ONLY
End: 2021-03-22

## 2021-03-22 RX ORDER — TACROLIMUS 1 MG/G
OINTMENT TOPICAL
Qty: 1 | Refills: 0 | Status: ERX | COMMUNITY
Start: 2021-03-22

## 2023-04-24 NOTE — PROCEDURE: BIOPSY BY SHAVE METHOD
Pt called because she is out of wellbutrin early because dr increased her dosage. Would like a script for higher, 300 mg dose because the pharmacy won't refill the 150 mg early.     [id number] [place] [address] [phone] - F [phone]     buPROPion (WELLBUTRIN XL) 150 MG extended release tablet Detail Level: Detailed